# Patient Record
Sex: FEMALE | Race: BLACK OR AFRICAN AMERICAN | NOT HISPANIC OR LATINO | Employment: FULL TIME | URBAN - METROPOLITAN AREA
[De-identification: names, ages, dates, MRNs, and addresses within clinical notes are randomized per-mention and may not be internally consistent; named-entity substitution may affect disease eponyms.]

---

## 2018-11-12 RX ORDER — PANTOPRAZOLE SODIUM 40 MG/1
40 TABLET, DELAYED RELEASE ORAL 2 TIMES DAILY
Qty: 180 TABLET | Refills: 3 | Status: SHIPPED | OUTPATIENT
Start: 2018-11-12 | End: 2019-12-18 | Stop reason: SDUPTHER

## 2018-11-12 RX ORDER — PANTOPRAZOLE SODIUM 40 MG/1
40 TABLET, DELAYED RELEASE ORAL 2 TIMES DAILY
COMMUNITY
End: 2018-11-12 | Stop reason: SDUPTHER

## 2019-07-24 ENCOUNTER — TELEPHONE (OUTPATIENT)
Dept: GASTROENTEROLOGY | Facility: CLINIC | Age: 64
End: 2019-07-24

## 2019-08-11 ENCOUNTER — PREP FOR SURGERY (OUTPATIENT)
Dept: OTHER | Facility: HOSPITAL | Age: 64
End: 2019-08-11

## 2019-08-11 DIAGNOSIS — Z86.010 PERSONAL HISTORY OF COLONIC POLYPS: Primary | ICD-10-CM

## 2019-09-16 ENCOUNTER — OFFICE VISIT (OUTPATIENT)
Dept: GASTROENTEROLOGY | Facility: CLINIC | Age: 64
End: 2019-09-16

## 2019-09-16 VITALS
BODY MASS INDEX: 30.95 KG/M2 | WEIGHT: 192.6 LBS | SYSTOLIC BLOOD PRESSURE: 128 MMHG | HEIGHT: 66 IN | DIASTOLIC BLOOD PRESSURE: 66 MMHG

## 2019-09-16 DIAGNOSIS — K75.81 NASH (NONALCOHOLIC STEATOHEPATITIS): ICD-10-CM

## 2019-09-16 DIAGNOSIS — R79.89 LFT ELEVATION: Primary | ICD-10-CM

## 2019-09-16 PROCEDURE — 99213 OFFICE O/P EST LOW 20 MIN: CPT | Performed by: INTERNAL MEDICINE

## 2019-09-16 RX ORDER — ROSUVASTATIN CALCIUM 10 MG/1
10 TABLET, COATED ORAL
COMMUNITY

## 2019-09-16 RX ORDER — LOSARTAN POTASSIUM 50 MG/1
TABLET ORAL
COMMUNITY
Start: 2019-07-09

## 2019-09-16 RX ORDER — SPIRONOLACTONE 25 MG/1
25 TABLET ORAL DAILY
COMMUNITY

## 2019-09-16 NOTE — PROGRESS NOTES
"    PATIENT INFORMATION  Rai Thompson       - 1955    CHIEF COMPLAINT  Chief Complaint   Patient presents with   • Abnormal Lab     elevated LFT'S       HISTORY OF PRESENT ILLNESS  Her oforo LFt nad looks like ROBINS and had two sets and her weight went along with the increase    GERD but no Kamara's and no symptoms on her BID PPI            REVIEW OF SYSTEMS  Review of Systems   All other systems reviewed and are negative.        ACTIVE PROBLEMS  There are no active problems to display for this patient.        PAST MEDICAL HISTORY  Past Medical History:   Diagnosis Date   • Colon polyp          SURGICAL HISTORY  Past Surgical History:   Procedure Laterality Date   • COLONOSCOPY           FAMILY HISTORY  Family History   Problem Relation Age of Onset   • Colon cancer Neg Hx    • Colon polyps Neg Hx          SOCIAL HISTORY  Social History     Occupational History   • Not on file   Tobacco Use   • Smoking status: Never Smoker   • Smokeless tobacco: Never Used   Substance and Sexual Activity   • Alcohol use: Yes     Frequency: Never   • Drug use: Not on file   • Sexual activity: Not on file       Debilities/Disabilities Identified: None    Emotional Behavior: Appropriate    CURRENT MEDICATIONS    Current Outpatient Medications:   •  spironolactone (ALDACTONE) 25 MG tablet, Take 25 mg by mouth Daily., Disp: , Rfl:   •  losartan (COZAAR) 50 MG tablet, , Disp: , Rfl:   •  pantoprazole (PROTONIX) 40 MG EC tablet, Take 1 tablet by mouth 2 (Two) Times a Day., Disp: 180 tablet, Rfl: 3  •  rosuvastatin (CRESTOR) 10 MG tablet, Take 10 mg by mouth., Disp: , Rfl:     ALLERGIES  Patient has no known allergies.    VITALS  Vitals:    19 1636   BP: 128/66   Weight: 87.4 kg (192 lb 9.6 oz)   Height: 167.6 cm (66\")       LAST RESULTS   Conversion Encounter on 2016   Component Date Value Ref Range Status   • Glucose 2016 96  65 - 99 mg/dL Final   • BUN 2016 15  6 - 24 mg/dL Final   • Creatinine " 04/01/2016 0.93  0.57 - 1.00 mg/dL Final   • eGFR Non African Am 04/01/2016 67  >59 mL/min/1.73 Final   • eGFR African Am 04/01/2016 78  >59 mL/min/1.73 Final   • BUN/Creatinine Ratio 04/01/2016 16  9 - 23 Final   • Sodium 04/01/2016 144  134 - 144 mmol/L Final   • Potassium 04/01/2016 4.1  3.5 - 5.2 mmol/L Final   • Chloride 04/01/2016 106  97 - 108 mmol/L Final   • Total CO2 04/01/2016 25  18 - 29 mmol/L Final   • Calcium 04/01/2016 9.2  8.7 - 10.2 mg/dL Final   • Total Protein 04/01/2016 6.7  6.0 - 8.5 g/dL Final   • Albumin 04/01/2016 4.2  3.5 - 5.5 g/dL Final   • Globulin 04/01/2016 2.5  1.5 - 4.5 g/dL Final   • A/G Ratio 04/01/2016 1.7  1.1 - 2.5 Final   • Total Bilirubin 04/01/2016 0.3  0.0 - 1.2 mg/dL Final   • Alkaline Phosphatase 04/01/2016 63  39 - 117 IU/L Final   • AST (SGOT) 04/01/2016 20  0 - 40 IU/L Final   • ALT (SGPT) 04/01/2016 18  0 - 32 IU/L Final   • Total Cholesterol 04/01/2016 142  100 - 199 mg/dL Final   • Triglycerides 04/01/2016 73  0 - 149 mg/dL Final   • HDL Cholesterol 04/01/2016 56  >39 mg/dL Final    Comment: According to ATP-III Guidelines, HDL-C >59 mg/dL is considered a  negative risk factor for CHD.     • VLDL Cholesterol 04/01/2016 15  5 - 40 mg/dL Final   • LDL Cholesterol  04/01/2016 71  0 - 99 mg/dL Final   • TIBC 04/01/2016 299  250 - 450 ug/dL Final   • UIBC 04/01/2016 237  131 - 425 ug/dL Final   • Iron 04/01/2016 62  27 - 159 ug/dL Final   • Iron Saturation 04/01/2016 21  15 - 55 % Final   • Ferritin 04/01/2016 106  15 - 150 ng/mL Final   • WBC 04/01/2016 4.9  3.4 - 10.8 x10E3/uL Final   • RBC 04/01/2016 5.63* 3.77 - 5.28 x10E6/uL Final   • Hemoglobin 04/01/2016 12.2  11.1 - 15.9 g/dL Final   • Hematocrit 04/01/2016 38.3  34.0 - 46.6 % Final   • MCV 04/01/2016 68* 79 - 97 fL Final   • MCH 04/01/2016 21.7* 26.6 - 33.0 pg Final   • MCHC 04/01/2016 31.9  31.5 - 35.7 g/dL Final   • RDW 04/01/2016 16.7* 12.3 - 15.4 % Final   • Platelets 04/01/2016 205  150 - 379 x10E3/uL Final    • Neutrophil Rel % 04/01/2016 53  % Final   • Lymphocyte Rel % 04/01/2016 41  % Final   • Monocyte Rel % 04/01/2016 5  % Final   • Eosinophil Rel % 04/01/2016 1  % Final   • Basophil Rel % 04/01/2016 0  % Final   • Neutrophils Absolute 04/01/2016 2.6  1.4 - 7.0 x10E3/uL Final   • Lymphocytes Absolute 04/01/2016 2.0  0.7 - 3.1 x10E3/uL Final   • Monocytes Absolute 04/01/2016 0.3  0.1 - 0.9 x10E3/uL Final   • Eosinophils Absolute 04/01/2016 0.0  0.0 - 0.4 x10E3/uL Final   • Basophils Absolute 04/01/2016 0.0  0.0 - 0.2 x10E3/uL Final   • Immature Granulocyte Rel % 04/01/2016 0  % Final   • Immature Grans Absolute 04/01/2016 0.0  0.0 - 0.1 x10E3/uL Final     No results found.    PHYSICAL EXAM  Physical Exam   Constitutional: She is oriented to person, place, and time. She appears well-developed and well-nourished.   HENT:   Head: Normocephalic and atraumatic.   Eyes: Conjunctivae and EOM are normal. Pupils are equal, round, and reactive to light. No scleral icterus.   Neck: Normal range of motion. Neck supple. No thyromegaly present.   Cardiovascular: Normal rate, regular rhythm, normal heart sounds and intact distal pulses. Exam reveals no gallop.   No murmur heard.  Pulmonary/Chest: Effort normal and breath sounds normal. She has no wheezes. She has no rales.   Abdominal: Soft. Bowel sounds are normal. She exhibits no shifting dullness, no distension, no fluid wave, no abdominal bruit, no ascites and no mass. There is no hepatosplenomegaly. There is no guarding and negative Bass's sign. Hernia confirmed negative in the ventral area.   Musculoskeletal: Normal range of motion. She exhibits no edema.   Lymphadenopathy:     She has no cervical adenopathy.   Neurological: She is alert and oriented to person, place, and time.   Skin: Skin is warm and dry. No rash noted. She is not diaphoretic. No erythema.   No signs of CLD   Psychiatric: She has a normal mood and affect. Her behavior is normal.        ASSESSMENT  Diagnoses and all orders for this visit:    LFT elevation  -     US Liver; Future  -     ROBINS Fibrosure    ROBINS (nonalcoholic steatohepatitis)    Other orders  -     losartan (COZAAR) 50 MG tablet  -     rosuvastatin (CRESTOR) 10 MG tablet; Take 10 mg by mouth.  -     spironolactone (ALDACTONE) 25 MG tablet; Take 25 mg by mouth Daily.          PLAN  Return in about 2 months (around 11/16/2019).

## 2019-10-07 ENCOUNTER — HOSPITAL ENCOUNTER (OUTPATIENT)
Dept: ULTRASOUND IMAGING | Facility: HOSPITAL | Age: 64
Discharge: HOME | End: 2019-10-07
Admitting: INTERNAL MEDICINE

## 2019-10-07 ENCOUNTER — LAB REQUISITION (OUTPATIENT)
Dept: LAB | Facility: HOSPITAL | Age: 64
End: 2019-10-07

## 2019-10-07 ENCOUNTER — OUTSIDE FACILITY SERVICE (OUTPATIENT)
Dept: GASTROENTEROLOGY | Facility: CLINIC | Age: 64
End: 2019-10-07

## 2019-10-07 DIAGNOSIS — Z86.010 PERSONAL HISTORY OF COLONIC POLYPS: ICD-10-CM

## 2019-10-07 DIAGNOSIS — R79.89 LFT ELEVATION: ICD-10-CM

## 2019-10-07 PROCEDURE — 88305 TISSUE EXAM BY PATHOLOGIST: CPT | Performed by: INTERNAL MEDICINE

## 2019-10-07 PROCEDURE — 76705 ECHO EXAM OF ABDOMEN: CPT

## 2019-10-07 PROCEDURE — 45380 COLONOSCOPY AND BIOPSY: CPT | Performed by: INTERNAL MEDICINE

## 2019-10-08 LAB
CYTO UR: NORMAL
LAB AP CASE REPORT: NORMAL
LAB AP CLINICAL INFORMATION: NORMAL
PATH REPORT.FINAL DX SPEC: NORMAL
PATH REPORT.GROSS SPEC: NORMAL

## 2019-10-18 ENCOUNTER — TELEPHONE (OUTPATIENT)
Dept: SURGERY | Facility: CLINIC | Age: 64
End: 2019-10-18

## 2019-10-18 NOTE — TELEPHONE ENCOUNTER
SAW THE ORDER FOR SHIMON ABDULLAHI, SO CALLED PT.  SHE WOULD LIKE THE ORDER FAXED TO [facility] ON [address]  SHE WOULD ALSO LIKE A COPY OF THE ORDER FAXED TO HER AT # [phone].         PT IS UNDER THE IMPRESSION SHE IS TO HAVE SOME LABS TAKEN?  SHE WOULD LIKE TO CONFIRM THIS, ASK WHERE THE ORDER IS BEING SENT AND SHE WOULD LIKE A COPY OF THE ORDER AS WELL.  CALL PT AT # [phone].

## 2019-10-29 LAB
A2 MACROGLOB SERPL-MCNC: 145 MG/DL (ref 110–276)
ALT SERPL W P-5'-P-CCNC: 34 IU/L (ref 0–40)
APO A-I SERPL-MCNC: 167 MG/DL (ref 116–209)
AST SERPL W P-5'-P-CCNC: 28 IU/L (ref 0–40)
BILIRUB SERPL-MCNC: 0.3 MG/DL (ref 0–1.2)
CHOLEST SERPL-MCNC: 168 MG/DL (ref 100–199)
FIBROSIS SCORING:: ABNORMAL
FIBROSIS STAGE SERPL QL: ABNORMAL
GGT SERPL-CCNC: 41 IU/L (ref 0–60)
GLUCOSE SERPL-MCNC: 108 MG/DL (ref 65–99)
HAPTOGLOB SERPL-MCNC: 167 MG/DL (ref 34–200)
INTERPRETATIONS: (REFERENCE): ABNORMAL
LABORATORY COMMENT REPORT: ABNORMAL
LIVER FIBR SCORE SERPL CALC.FIBROSURE: 0.08 (ref 0–0.21)
NASH SCORING (REFERENCE): ABNORMAL
NECROINFLAMMATORY ACT GRADE SERPL QL: ABNORMAL
NECROINFLAMMATORY ACT SCORE SERPL: 0.5
SERVICE CMNT-IMP: ABNORMAL
STEATOSIS GRADE (REFERENCE): ABNORMAL
STEATOSIS GRADING (REFERENCE): ABNORMAL
STEATOSIS SCORE (REFERENCE): 0.69 (ref 0–0.3)
TRIGL SERPL-MCNC: 138 MG/DL (ref 0–149)

## 2019-12-18 RX ORDER — PANTOPRAZOLE SODIUM 40 MG/1
40 TABLET, DELAYED RELEASE ORAL 2 TIMES DAILY
Qty: 180 TABLET | Refills: 3 | Status: SHIPPED | OUTPATIENT
Start: 2019-12-18 | End: 2021-02-01 | Stop reason: SDUPTHER

## 2020-09-08 ENCOUNTER — TELEPHONE (OUTPATIENT)
Dept: INTERNAL MEDICINE | Facility: CLINIC | Age: 65
End: 2020-09-08

## 2020-09-08 NOTE — TELEPHONE ENCOUNTER
Patient will need to schedule a new patient appointment. We cannot see her for a cough - will nee to go to .

## 2020-09-08 NOTE — TELEPHONE ENCOUNTER
PATIENT IS REQUESTING AN APPOINTMENT WITH ESTEFANY GREEN. SHE WANTS TO SEE HER ABOUT AN ONGOING COUGH . SHE HAS HAD COUGH FOR A YEAR. SHE WANTS TO ESTABLISH CARE WITH MS GREEN.     PLEASE ADVISE  [phone]

## 2020-10-12 ENCOUNTER — TELEPHONE (OUTPATIENT)
Dept: INTERNAL MEDICINE | Facility: CLINIC | Age: 65
End: 2020-10-12

## 2020-10-12 NOTE — TELEPHONE ENCOUNTER
Call made to Ms. Thompson to r/s her appointment with the understanding that she was to have her cough addressed prior to coming in to the office to establish care for a NP.    Patient stated she had not been anywhere to address her cough and the reason for the NP appointment was to address the cough.     She stated she didn't want to be seen as a NP.

## 2021-02-01 RX ORDER — PANTOPRAZOLE SODIUM 40 MG/1
40 TABLET, DELAYED RELEASE ORAL 2 TIMES DAILY
Qty: 180 TABLET | Refills: 3 | Status: SHIPPED | OUTPATIENT
Start: 2021-02-01 | End: 2022-02-14

## 2022-02-14 RX ORDER — PANTOPRAZOLE SODIUM 40 MG/1
TABLET, DELAYED RELEASE ORAL
Qty: 180 TABLET | Refills: 0 | Status: SHIPPED | OUTPATIENT
Start: 2022-02-14 | End: 2022-08-22

## 2022-08-22 RX ORDER — PANTOPRAZOLE SODIUM 40 MG/1
TABLET, DELAYED RELEASE ORAL
Qty: 180 TABLET | Refills: 0 | Status: SHIPPED | OUTPATIENT
Start: 2022-08-22

## 2023-03-20 RX ORDER — PANTOPRAZOLE SODIUM 40 MG/1
TABLET, DELAYED RELEASE ORAL
Qty: 180 TABLET | Refills: 0 | OUTPATIENT
Start: 2023-03-20

## 2024-09-19 ENCOUNTER — TRANSCRIBE ORDERS (OUTPATIENT)
Dept: DIABETES SERVICES | Facility: HOSPITAL | Age: 69
End: 2024-09-19
Payer: COMMERCIAL

## 2024-09-19 DIAGNOSIS — R73.03 PRE-DIABETES: Primary | ICD-10-CM

## 2024-12-04 ENCOUNTER — TELEPHONE (OUTPATIENT)
Dept: GASTROENTEROLOGY | Facility: CLINIC | Age: 69
End: 2024-12-04
Payer: MEDICARE

## 2024-12-04 NOTE — TELEPHONE ENCOUNTER
PT WOULD LIKE TO REQUEST HER PAPERWORK BE FILLED OUT ON LINE VERSUS ON THE PAPERS SHE HAS  SHE WOULD LIKE IT EMAILED TO HER AND FOR HER TO SEND BACK VIA EMAIL  HER EMAIL IS [email or web address]  HER CALL BACK -[phone]    SHE HAS RECALL AND REF FOR SCOPE WITH DR BAY

## 2024-12-18 ENCOUNTER — TELEPHONE (OUTPATIENT)
Dept: GASTROENTEROLOGY | Facility: CLINIC | Age: 69
End: 2024-12-18
Payer: MEDICARE

## 2024-12-18 NOTE — TELEPHONE ENCOUNTER
LAST COLONOSCOPY-2019/ANN MARIE/DR DUKES  REF & RECALL  NO FAMILY HISTORY  DID NOT JODIE ANY  SCHEDULE AT EP

## 2024-12-20 DIAGNOSIS — Z86.0101 PERSONAL HISTORY OF ADENOMATOUS AND SERRATED COLON POLYPS: Primary | ICD-10-CM

## 2025-02-19 NOTE — SIGNIFICANT NOTE
Education provided the Patient on the following:    - Nothing to Eat or Drink after MN the night before the procedure    - Avoid red/purple fluids while completing their bowel prep as ordered by physician  -Contact Gastrointerologist office for any questions about specific details regarding colon prep    -You will need to have someone drive you home after your colonoscopy and remain with you for 24 hours after the procedure  - The date of your Surgery, you may have one visitor at bedside or within 10-15 minutes of [facility]  - Please wear warm socks when you arrive for your colonoscopy  - Remove all jewelry and leave any valuables before arriving the day of your procedure (all will have to be removed before leaving preop)  - You will need to arrive at 0930 on 2/21/2025 for your colonoscopy    -Feel free to contact us at: [phone] with any additional questions/concerns

## 2025-02-21 ENCOUNTER — HOSPITAL ENCOUNTER (OUTPATIENT)
Facility: SURGERY CENTER | Age: 70
Setting detail: HOSPITAL OUTPATIENT SURGERY
Discharge: HOME | End: 2025-02-21
Attending: INTERNAL MEDICINE | Admitting: INTERNAL MEDICINE
Payer: MEDICARE

## 2025-02-21 ENCOUNTER — ANESTHESIA (OUTPATIENT)
Dept: SURGERY | Facility: SURGERY CENTER | Age: 70
End: 2025-02-21
Payer: MEDICARE

## 2025-02-21 ENCOUNTER — ANESTHESIA EVENT (OUTPATIENT)
Dept: SURGERY | Facility: SURGERY CENTER | Age: 70
End: 2025-02-21
Payer: MEDICARE

## 2025-02-21 VITALS
HEART RATE: 78 BPM | OXYGEN SATURATION: 100 % | WEIGHT: 191.8 LBS | TEMPERATURE: 98.1 F | HEIGHT: 66 IN | RESPIRATION RATE: 16 BRPM | BODY MASS INDEX: 30.82 KG/M2 | DIASTOLIC BLOOD PRESSURE: 74 MMHG | SYSTOLIC BLOOD PRESSURE: 125 MMHG

## 2025-02-21 PROCEDURE — 25010000002 PROPOFOL 10 MG/ML EMULSION: Performed by: NURSE ANESTHETIST, CERTIFIED REGISTERED

## 2025-02-21 PROCEDURE — 25810000003 LACTATED RINGERS PER 1000 ML: Performed by: INTERNAL MEDICINE

## 2025-02-21 PROCEDURE — 25010000002 LIDOCAINE 2% SOLUTION: Performed by: NURSE ANESTHETIST, CERTIFIED REGISTERED

## 2025-02-21 PROCEDURE — G0105 COLORECTAL SCRN; HI RISK IND: HCPCS | Performed by: INTERNAL MEDICINE

## 2025-02-21 PROCEDURE — 25010000002 PROPOFOL 1000 MG/100ML EMULSION: Performed by: NURSE ANESTHETIST, CERTIFIED REGISTERED

## 2025-02-21 RX ORDER — PROPOFOL 10 MG/ML
INJECTION, EMULSION INTRAVENOUS AS NEEDED
Status: DISCONTINUED | OUTPATIENT
Start: 2025-02-21 | End: 2025-02-21 | Stop reason: SURG

## 2025-02-21 RX ORDER — SODIUM CHLORIDE 0.9 % (FLUSH) 0.9 %
10 SYRINGE (ML) INJECTION AS NEEDED
Status: DISCONTINUED | OUTPATIENT
Start: 2025-02-21 | End: 2025-02-21 | Stop reason: HOSPADM

## 2025-02-21 RX ORDER — CHOLECALCIFEROL (VITAMIN D3) 25 MCG
1 TABLET ORAL DAILY
COMMUNITY

## 2025-02-21 RX ORDER — LIDOCAINE HYDROCHLORIDE 10 MG/ML
0.5 INJECTION, SOLUTION INFILTRATION; PERINEURAL ONCE AS NEEDED
Status: DISCONTINUED | OUTPATIENT
Start: 2025-02-21 | End: 2025-02-21 | Stop reason: HOSPADM

## 2025-02-21 RX ORDER — SODIUM CHLORIDE, SODIUM LACTATE, POTASSIUM CHLORIDE, CALCIUM CHLORIDE 600; 310; 30; 20 MG/100ML; MG/100ML; MG/100ML; MG/100ML
20 INJECTION, SOLUTION INTRAVENOUS CONTINUOUS
Status: DISCONTINUED | OUTPATIENT
Start: 2025-02-21 | End: 2025-02-21 | Stop reason: HOSPADM

## 2025-02-21 RX ORDER — ESCITALOPRAM OXALATE 10 MG/1
10 TABLET ORAL DAILY
COMMUNITY
Start: 2025-01-09 | End: 2025-07-08

## 2025-02-21 RX ORDER — ASCORBIC ACID 125 MG
1 TABLET,CHEWABLE ORAL DAILY
COMMUNITY

## 2025-02-21 RX ORDER — DIPHENOXYLATE HYDROCHLORIDE AND ATROPINE SULFATE 2.5; .025 MG/1; MG/1
1 TABLET ORAL DAILY
COMMUNITY

## 2025-02-21 RX ORDER — LIDOCAINE HYDROCHLORIDE 20 MG/ML
INJECTION, SOLUTION INFILTRATION; PERINEURAL AS NEEDED
Status: DISCONTINUED | OUTPATIENT
Start: 2025-02-21 | End: 2025-02-21 | Stop reason: SURG

## 2025-02-21 RX ADMIN — SODIUM CHLORIDE, POTASSIUM CHLORIDE, SODIUM LACTATE AND CALCIUM CHLORIDE 20 ML/HR: 600; 310; 30; 20 INJECTION, SOLUTION INTRAVENOUS at 10:12

## 2025-02-21 RX ADMIN — LIDOCAINE HYDROCHLORIDE 60 MG: 20 INJECTION, SOLUTION INFILTRATION; PERINEURAL at 11:15

## 2025-02-21 RX ADMIN — PROPOFOL 100 MG: 10 INJECTION, EMULSION INTRAVENOUS at 11:15

## 2025-02-21 RX ADMIN — PROPOFOL 200 MCG/KG/MIN: 10 INJECTION, EMULSION INTRAVENOUS at 11:15

## 2025-02-21 NOTE — ANESTHESIA POSTPROCEDURE EVALUATION
Patient: Rai Thompson    Procedure Summary       Date: 02/21/25 Room / Location: [facility] / [facility]    Anesthesia Start: 1108 Anesthesia Stop: 1137    Procedure: COLONOSCOPY to Cecum Diagnosis:       Personal history of adenomatous and serrated colon polyps      Diverticulosis      (Personal history of adenomatous and serrated colon polyps [Z86.0101])    Surgeons: Pedro Bueno MD Provider: Pari Walters MD    Anesthesia Type: MAC ASA Status: 2            Anesthesia Type: MAC    Vitals  Vitals Value Taken Time   /74 02/21/25 1150   Temp 36.7 °C (98.1 °F) 02/21/25 1135   Pulse 78 02/21/25 1150   Resp 16 02/21/25 1150   SpO2 100 % 02/21/25 1150           Post Anesthesia Care and Evaluation    Patient location during evaluation: PHASE II  Patient participation: complete - patient participated  Level of consciousness: awake  Pain management: adequate    Airway patency: patent  Anesthetic complications: No anesthetic complications  PONV Status: none  Cardiovascular status: stable  Respiratory status: acceptable  Hydration status: acceptable

## 2025-02-21 NOTE — H&P
"Patient Care Team:  Aide Cat DO as PCP - General (Internal Medicine)    CHIEF COMPLAINT: Personal hx colon polyps    HISTORY OF PRESENT ILLNESS:  Last Exam was 2019    Past Medical History:   Diagnosis Date    Colon polyp     Hyperlipidemia     Hypertension      Past Surgical History:   Procedure Laterality Date    COLONOSCOPY       Family History   Problem Relation Age of Onset    Colon cancer Neg Hx     Colon polyps Neg Hx      Social History     Tobacco Use    Smoking status: Never    Smokeless tobacco: Never   Substance Use Topics    Alcohol use: Yes     Comment: social    Drug use: Never     Medications Prior to Admission   Medication Sig Dispense Refill Last Dose/Taking    cholecalciferol (VITAMIN D3) 25 MCG (1000 UT) tablet Take 1 tablet by mouth Daily.   Past Week    escitalopram (LEXAPRO) 10 MG tablet Take 1 tablet by mouth Daily.   Past Week    losartan (COZAAR) 50 MG tablet    Past Week    Magnesium 250 MG capsule Take 1 capsule by mouth Daily.   Past Week    Menaquinone-7 (Vitamin K2) 100 MCG capsule Take 1 capsule by mouth Daily.   Past Week    Multi-Vitamin tablet tablet Take 1 tablet by mouth Daily.   Past Week    pantoprazole (PROTONIX) 40 MG EC tablet TAKE 1 TABLET BY MOUTH TWICE A  tablet 0 Past Week    rosuvastatin (CRESTOR) 10 MG tablet Take 1 tablet by mouth.   Past Week    spironolactone (ALDACTONE) 25 MG tablet Take 1 tablet by mouth Daily.   Past Week     Allergies:  Patient has no known allergies.    REVIEW OF SYSTEMS:  Please see the above history of present illness for pertinent positives and negatives.  The remainder of the patient's systems have been reviewed and are negative.     Vital Signs  Temp:  [97.3 °F (36.3 °C)] 97.3 °F (36.3 °C)  Heart Rate:  [77] 77  Resp:  [16] 16  BP: (155)/(85) 155/85    Flowsheet Rows      Flowsheet Row First Filed Value   Admission Height 167.6 cm (66\") Documented at 02/19/2025 1316   Admission Weight 86.2 kg (190 lb) Documented at " 02/19/2025 1316             Physical Exam:  Physical Exam   Constitutional: Patient appears well-developed and well-nourished and in no acute distress   HEENT:   Head: Normocephalic and atraumatic.   Eyes:  Pupils are equal, round, and reactive to light. EOM are intact. Sclerae are anicteric and non-injected.  Mouth and Throat: Patient has moist mucous membranes. Oropharynx is clear of any erythema or exudate.     Neck: Neck supple. No JVD present. No thyromegaly present. No lymphadenopathy present.  Cardiovascular: Regular rate, regular rhythm, S1 normal and S2 normal.  Exam reveals no gallop and no friction rub.  No murmur heard.  Pulmonary/Chest: Lungs are clear to auscultation bilaterally. No respiratory distress. No wheezes. No rhonchi. No rales.   Abdominal: Soft. Bowel sounds are normal. No distension and no mass. There is no hepatosplenomegaly. There is no tenderness.   Musculoskeletal: Normal Muscle tone  Extremities: No edema. Pulses are palpable in all 4 extremities.  Neurological: Patient is alert and oriented to person, place, and time. Cranial nerves II-XII are grossly intact with no focal deficits.  Skin: Skin is warm. No rash noted. Nails show no clubbing.  No cyanosis or erythema.    Debilities/Disabilities Identified: None  Emotional Behavior: Appropriate     Results Review:   I reviewed the patient's new clinical results.    Lab Results (most recent)       None            Imaging Results (Most Recent)       None          reviewed    ECG/EMG Results (most recent)       None          reviewed    Assessment & Plan   Personal hx colon polyps/  colonoscopy      I discussed the patient's findings and my recommendations with patient.     Pedro Bueno MD  02/21/25  10:34 EST    Time: 10 min prior to procedure.

## 2025-02-21 NOTE — BRIEF OP NOTE
COLONOSCOPY  Progress Note    Rai Thompson  2/21/2025    Pre-op Diagnosis:   Personal history of adenomatous and serrated colon polyps [Z86.0101]       Post-Op Diagnosis Codes:     * Personal history of adenomatous and serrated colon polyps [Z86.0101]     * Diverticulosis [K57.90]    Procedure(s):      Procedure(s):  COLONOSCOPY to Cecum              Surgeon(s):  Pedro Bueno MD    Anesthesia: Monitored Anesthesia Care    Staff:   Endo Technician: Loni Haas  Endo Nurse: Judith Angel RN       Estimated Blood Loss: none    Urine Voided: * No values recorded between 2/21/2025 11:08 AM and 2/21/2025 11:37 AM *    Specimens:                None      Drains: * No LDAs found *    Findings: Colon to TI good Prep  Sigmoid Diverticulosis        Complications: None          Pedro Bueno MD     Date: 2/21/2025  Time: 11:37 EST

## 2025-02-21 NOTE — ANESTHESIA PREPROCEDURE EVALUATION
" Anesthesia Evaluation     Patient summary reviewed and Nursing notes reviewed   no history of anesthetic complications:   NPO Solid Status: > 8 hours  NPO Liquid Status: > 2 hours           Airway   Mallampati: I  TM distance: >3 FB  No difficulty expected  Dental - normal exam     Pulmonary - normal exam   (-) COPD, asthma  Cardiovascular   Exercise tolerance: good (4-7 METS)    Rhythm: irregular    (+) hypertension 2 medications or greater, hyperlipidemia  (-) past MI, angina, cardiac stents    PE comment: Frequent PVC    Neuro/Psych  (-) seizures, CVA  GI/Hepatic/Renal/Endo    (+) diabetes mellitus (\"pre-DM\")  (-) GERD, liver disease, no renal disease    ROS Comment: Colon polyp    Musculoskeletal     Abdominal    Substance History - negative use     OB/GYN          Other - negative ROS                         Anesthesia Plan    ASA 2     MAC     (I have reviewed the patient's history and chart with the patient, including all pertinent laboratory results and imaging. I have explained the risks of monitored anesthesia care including but not limited to respiratory depression, possible need for airway intervention, or possible intra-op recall. I explained that airway intervention involves risk of possible dental injury.    VITALS:  /85 (BP Location: Left arm, Patient Position: Lying)   Pulse 77   Temp 36.3 °C (97.3 °F) (Temporal)   Resp 16   Ht 167.6 cm (66\")   Wt 87 kg (191 lb 12.8 oz)   SpO2 99%   BMI 30.96 kg/m² )  intravenous induction     Anesthetic plan, risks, benefits, and alternatives have been provided, discussed and informed consent has been obtained with: patient.  Pre-procedure education provided      CODE STATUS:         "

## (undated) DEVICE — Device

## (undated) DEVICE — FLEX ADVANTAGE 1500CC: Brand: FLEX ADVANTAGE

## (undated) DEVICE — JACKT LAB F/R KNIT CUFF/COLR XLG BLU

## (undated) DEVICE — CANN O2 ETCO2 FITS ALL CONN CO2 SMPL A/ 7IN DISP LF

## (undated) DEVICE — GOWN ISOL W/THUMB UNIV BLU BX/15

## (undated) DEVICE — KT ORCA ORCAPOD DISP STRL

## (undated) DEVICE — ADAPT CLN SCPE ENDO PORPOISE BX/50 DISP